# Patient Record
Sex: FEMALE | URBAN - METROPOLITAN AREA
[De-identification: names, ages, dates, MRNs, and addresses within clinical notes are randomized per-mention and may not be internally consistent; named-entity substitution may affect disease eponyms.]

---

## 2018-06-04 ENCOUNTER — Encounter (OUTPATIENT)
Dept: URBAN - METROPOLITAN AREA SURGERY 10 | Facility: SURGERY | Age: 83
End: 2018-06-04
Payer: COMMERCIAL

## 2018-06-04 PROCEDURE — 66984 XCAPSL CTRC RMVL W/O ECP: CPT | Performed by: OPHTHALMOLOGY

## 2018-06-05 ENCOUNTER — POST-OPERATIVE VISIT (OUTPATIENT)
Dept: URBAN - METROPOLITAN AREA CLINIC 29 | Facility: CLINIC | Age: 83
End: 2018-06-05

## 2018-06-05 RX ORDER — OFLOXACIN 3 MG/ML
0.3 % SOLUTION/ DROPS OPHTHALMIC
Qty: 5 | Refills: 1 | Status: INACTIVE
Start: 2018-06-05 | End: 2018-06-15

## 2018-06-05 RX ORDER — PREDNISOLONE ACETATE 10 MG/ML
1 % SUSPENSION/ DROPS OPHTHALMIC
Qty: 10 | Refills: 1 | Status: INACTIVE
Start: 2018-06-05 | End: 2018-06-19

## 2018-06-05 ASSESSMENT — INTRAOCULAR PRESSURE
OS: 18
OD: 17

## 2018-06-13 ENCOUNTER — OFFICE VISIT (OUTPATIENT)
Dept: URBAN - METROPOLITAN AREA CLINIC 29 | Facility: CLINIC | Age: 83
End: 2018-06-13
Payer: COMMERCIAL

## 2018-06-13 DIAGNOSIS — Z96.1 PRESENCE OF INTRAOCULAR LENS: ICD-10-CM

## 2018-06-13 DIAGNOSIS — H25.011 CORTICAL AGE-RELATED CATARACT, RIGHT EYE: Primary | ICD-10-CM

## 2018-06-13 ASSESSMENT — INTRAOCULAR PRESSURE
OD: 16
OS: 17

## 2018-06-13 ASSESSMENT — VISUAL ACUITY: OD: 20/50

## 2018-06-13 NOTE — IMPRESSION/PLAN
Impression: Presence of intraocular lens: Z96.1. Condition: established, stable. OS. Plan: d/c abx, taper steroid per plan. d/c shield.

## 2018-06-13 NOTE — IMPRESSION/PLAN
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 .  Visually significant, quality of life issue, could improve with surgery. Plan: Cataracts account for the patient's complaints. Discussed all risks, benefits, alternatives, procedures and recovery. Patient understands changing glasses will not improve vision.   Patient desires to have surgery, recommend phacoemulsification with intraocular lens implant OD.  lvl 2 - pt wants toric IOL

## 2018-06-18 ENCOUNTER — SURGERY (OUTPATIENT)
Dept: URBAN - METROPOLITAN AREA SURGERY 11 | Facility: SURGERY | Age: 83
End: 2018-06-18
Payer: COMMERCIAL

## 2018-06-18 PROCEDURE — 66984 XCAPSL CTRC RMVL W/O ECP: CPT | Performed by: OPHTHALMOLOGY

## 2018-06-19 ENCOUNTER — POST-OPERATIVE VISIT (OUTPATIENT)
Dept: URBAN - METROPOLITAN AREA CLINIC 29 | Facility: CLINIC | Age: 83
End: 2018-06-19

## 2018-06-19 PROCEDURE — 99024 POSTOP FOLLOW-UP VISIT: CPT | Performed by: OPTOMETRIST

## 2018-06-19 RX ORDER — PREDNISOLONE ACETATE 10 MG/ML
1 % SUSPENSION/ DROPS OPHTHALMIC
Qty: 10 | Refills: 1 | Status: INACTIVE
Start: 2018-06-05 | End: 2018-07-09

## 2018-06-19 RX ORDER — OFLOXACIN 3 MG/ML
0.3 % SOLUTION/ DROPS OPHTHALMIC
Qty: 5 | Refills: 1 | Status: INACTIVE
Start: 2018-06-19 | End: 2018-06-25

## 2018-06-19 RX ORDER — PREDNISOLONE ACETATE 10 MG/ML
1 % SUSPENSION/ DROPS OPHTHALMIC
Qty: 10 | Refills: 1 | Status: INACTIVE
Start: 2018-06-19 | End: 2018-07-23

## 2018-06-26 ENCOUNTER — POST-OPERATIVE VISIT (OUTPATIENT)
Dept: URBAN - METROPOLITAN AREA CLINIC 29 | Facility: CLINIC | Age: 83
End: 2018-06-26

## 2018-06-26 ASSESSMENT — INTRAOCULAR PRESSURE
OS: 17
OD: 17

## 2018-07-30 ENCOUNTER — POST-OPERATIVE VISIT (OUTPATIENT)
Dept: URBAN - METROPOLITAN AREA CLINIC 29 | Facility: CLINIC | Age: 83
End: 2018-07-30

## 2018-07-30 DIAGNOSIS — Z09 ENCNTR FOR F/U EXAM AFT TRTMT FOR COND OTH THAN MALIG NEOPLM: Primary | ICD-10-CM

## 2018-07-30 ASSESSMENT — INTRAOCULAR PRESSURE
OS: 16
OD: 16

## 2018-10-29 ENCOUNTER — OFFICE VISIT (OUTPATIENT)
Dept: URBAN - METROPOLITAN AREA CLINIC 29 | Facility: CLINIC | Age: 83
End: 2018-10-29
Payer: COMMERCIAL

## 2018-10-29 PROCEDURE — 92014 COMPRE OPH EXAM EST PT 1/>: CPT | Performed by: OPTOMETRIST

## 2018-10-29 ASSESSMENT — INTRAOCULAR PRESSURE
OS: 15
OD: 15

## 2018-10-29 NOTE — IMPRESSION/PLAN
Impression: Drusen (degenerative) of macula, right eye: H35.361. OD. Plan: Discussed diagnosis in detail with patient. Discussed treatment options with patient. Use of vitamins has shown to improve the effects of ARMD. Will continue to observe condition and or symptoms.

## 2019-11-14 ENCOUNTER — OFFICE VISIT (OUTPATIENT)
Dept: URBAN - METROPOLITAN AREA CLINIC 29 | Facility: CLINIC | Age: 84
End: 2019-11-14
Payer: MEDICARE

## 2019-11-14 DIAGNOSIS — E11.9 TYPE 2 DIABETES MELLITUS W/O COMPLICATION: ICD-10-CM

## 2019-11-14 PROCEDURE — 92014 COMPRE OPH EXAM EST PT 1/>: CPT | Performed by: OPTOMETRIST

## 2019-11-14 ASSESSMENT — INTRAOCULAR PRESSURE
OS: 16
OD: 15

## 2019-11-14 NOTE — IMPRESSION/PLAN
Impression: Type 2 diabetes mellitus w/o complication: M18.7. No Diabetic related eye Disease OU Plan: Discussed diagnosis in detail with patient. Emphasized blood sugar control. Will continue to observe condition and or symptoms. Call if symptoms occur.

## 2019-11-14 NOTE — IMPRESSION/PLAN
Impression: Drusen (degenerative) of macula, right eye: H35.361 OD. Plan: Discussed diagnosis in detail with patient. Discussed treatment options with patient. Use of vitamins has shown to improve the effects of ARMD.  Use of Amsler grid was explained. Will continue to observe condition and or symptoms. Discussed risks of progression. Call if [address] worsens.

## 2021-03-18 ENCOUNTER — OFFICE VISIT (OUTPATIENT)
Dept: URBAN - METROPOLITAN AREA CLINIC 29 | Facility: CLINIC | Age: 86
End: 2021-03-18
Payer: MEDICARE

## 2021-03-18 DIAGNOSIS — H35.361 DRUSEN (DEGENERATIVE) OF MACULA, RIGHT EYE: ICD-10-CM

## 2021-03-18 DIAGNOSIS — R73.03 PREDIABETES: Primary | ICD-10-CM

## 2021-03-18 PROCEDURE — 92014 COMPRE OPH EXAM EST PT 1/>: CPT | Performed by: OPTOMETRIST

## 2021-03-18 PROCEDURE — 92134 CPTRZ OPH DX IMG PST SGM RTA: CPT | Performed by: OPTOMETRIST

## 2021-03-18 ASSESSMENT — INTRAOCULAR PRESSURE
OS: 18
OD: 18

## 2021-03-18 NOTE — IMPRESSION/PLAN
Impression: Prediabetes: R73.03. Condition: established, stable. No Diabetic related eye Disease OU Plan: Discussed diagnosis in detail with patient. Emphasized blood sugar control. Will continue to observe condition and or symptoms. Call if symptoms occur.

## 2021-03-18 NOTE — IMPRESSION/PLAN
Impression: Drusen (degenerative) of macula, right eye: H35.361 OD. Condition: established, stable. Plan: Discussed diagnosis in detail with patient. Discussed treatment options with patient. Use of vitamins has shown to improve the effects of ARMD.  Use of Amsler grid was explained. Will continue to observe condition and or symptoms. Discussed risks of progression. Call if [place] worsens.

## 2021-03-18 NOTE — ASSESSMENT/PLAN
Impression: OCT MAC - OD: Good-; OS: Good- Plan: ARMD Drusen without signs of edema or hemorrhaging OD. Garcia Travis Normal macula appearance without signs of disease OS.

## 2022-10-19 ENCOUNTER — OFFICE VISIT (OUTPATIENT)
Dept: URBAN - METROPOLITAN AREA CLINIC 28 | Facility: CLINIC | Age: 87
End: 2022-10-19
Payer: MEDICARE

## 2022-10-19 DIAGNOSIS — E11.9 TYPE 2 DIABETES MELLITUS W/O COMPLICATION: Primary | ICD-10-CM

## 2022-10-19 DIAGNOSIS — H35.361 DRUSEN (DEGENERATIVE) OF MACULA, RIGHT EYE: ICD-10-CM

## 2022-10-19 DIAGNOSIS — Z79.84 LONG TERM CURRENT USE OF ORAL HYPOGLYCEMIC DRUGS: ICD-10-CM

## 2022-10-19 DIAGNOSIS — H52.223 REGULAR ASTIGMATISM, BILATERAL: ICD-10-CM

## 2022-10-19 PROCEDURE — 92014 COMPRE OPH EXAM EST PT 1/>: CPT | Performed by: OPTOMETRIST

## 2022-10-19 ASSESSMENT — VISUAL ACUITY
OD: 20/20
OS: 20/20

## 2022-10-19 ASSESSMENT — INTRAOCULAR PRESSURE
OS: 18
OD: 18

## 2022-10-19 NOTE — IMPRESSION/PLAN
Impression: Drusen (degenerative) of macula, right eye: H35.361 OD. Condition: established, stable. Plan: Discussed diagnosis in detail with patient. Discussed treatment options with patient. Use of vitamins has shown to improve the effects of ARMD.  Use of Amsler grid was explained. Will continue to observe condition and or symptoms. Discussed risks of progression. Call if [address] worsens.

## 2022-10-19 NOTE — IMPRESSION/PLAN
Impression: Type 2 diabetes mellitus w/o complication: S97.5. No Diabetic related eye Disease OU Plan: Discussed diagnosis in detail with patient. OPTOS ordered & reviewed with patient. Emphasized blood sugar control. Will continue to observe condition and or symptoms. Call if symptoms occur.

## 2024-03-05 ENCOUNTER — OFFICE VISIT (OUTPATIENT)
Dept: URBAN - METROPOLITAN AREA CLINIC 28 | Facility: CLINIC | Age: 89
End: 2024-03-05
Payer: MEDICARE

## 2024-03-05 DIAGNOSIS — H52.223 REGULAR ASTIGMATISM, BILATERAL: ICD-10-CM

## 2024-03-05 DIAGNOSIS — Z79.84 LONG TERM CURRENT USE OF ORAL HYPOGLYCEMIC DRUGS: ICD-10-CM

## 2024-03-05 DIAGNOSIS — E11.9 TYPE 2 DIABETES MELLITUS W/O COMPLICATION: Primary | ICD-10-CM

## 2024-03-05 PROCEDURE — 92014 COMPRE OPH EXAM EST PT 1/>: CPT | Performed by: OPTOMETRIST

## 2024-03-05 ASSESSMENT — KERATOMETRY
OD: 42.88
OS: 43.00

## 2024-03-05 ASSESSMENT — VISUAL ACUITY
OS: 20/20
OD: 20/20

## 2024-03-05 ASSESSMENT — INTRAOCULAR PRESSURE
OS: 18
OD: 19

## 2025-03-25 ENCOUNTER — OFFICE VISIT (OUTPATIENT)
Dept: URBAN - METROPOLITAN AREA CLINIC 28 | Facility: CLINIC | Age: 89
End: 2025-03-25
Payer: MEDICARE

## 2025-03-25 DIAGNOSIS — Z79.84 LONG TERM CURRENT USE OF ORAL HYPOGLYCEMIC DRUGS: ICD-10-CM

## 2025-03-25 DIAGNOSIS — E11.9 TYPE 2 DIABETES MELLITUS WITHOUT COMPLICATIONS: Primary | ICD-10-CM

## 2025-03-25 DIAGNOSIS — H52.223 REGULAR ASTIGMATISM, BILATERAL: ICD-10-CM

## 2025-03-25 DIAGNOSIS — H40.013 OPEN ANGLE WITH BORDERLINE FINDINGS, LOW RISK, BILATERAL: ICD-10-CM

## 2025-03-25 PROCEDURE — 92133 CPTRZD OPH DX IMG PST SGM ON: CPT | Performed by: OPTOMETRIST

## 2025-03-25 PROCEDURE — 92014 COMPRE OPH EXAM EST PT 1/>: CPT | Performed by: OPTOMETRIST

## 2025-03-25 ASSESSMENT — KERATOMETRY
OS: 43.00
OD: 42.88

## 2025-03-25 ASSESSMENT — INTRAOCULAR PRESSURE
OD: 20
OS: 20

## 2025-03-25 ASSESSMENT — VISUAL ACUITY
OD: 20/25
OS: 20/20